# Patient Record
Sex: FEMALE | Race: BLACK OR AFRICAN AMERICAN
[De-identification: names, ages, dates, MRNs, and addresses within clinical notes are randomized per-mention and may not be internally consistent; named-entity substitution may affect disease eponyms.]

---

## 2018-11-26 ENCOUNTER — HOSPITAL ENCOUNTER (EMERGENCY)
Dept: HOSPITAL 72 - EMR | Age: 23
Discharge: HOME | End: 2018-11-26
Payer: MEDICAID

## 2018-11-26 VITALS — HEIGHT: 67 IN | BODY MASS INDEX: 23.54 KG/M2 | WEIGHT: 150 LBS

## 2018-11-26 VITALS — SYSTOLIC BLOOD PRESSURE: 118 MMHG | DIASTOLIC BLOOD PRESSURE: 81 MMHG

## 2018-11-26 VITALS — DIASTOLIC BLOOD PRESSURE: 81 MMHG | SYSTOLIC BLOOD PRESSURE: 118 MMHG

## 2018-11-26 DIAGNOSIS — L30.9: Primary | ICD-10-CM

## 2018-11-26 PROCEDURE — 99283 EMERGENCY DEPT VISIT LOW MDM: CPT

## 2018-11-26 NOTE — EMERGENCY ROOM REPORT
History of Present Illness


General


Chief Complaint:  Skin Rash/Abscess


Source:  Patient





Present Illness


HPI


23-year-old female presents to the emergency department complaining of eczema 

flare to bilateral hands 1 week. 8/10 in severity itching and burning to site 

of rash.   Patient reports she ran out of her topical medication and is 

currently changing insurances and requires a refill. No aggravating or 

relieving factors.  Patient states her symptoms are consistent with symptoms 

that she experienced in the past with previous eczema flares.  Pt. denies fevers

, chills or swollen tender lymph nodes. Denies lesions/rashes elsewhere on the 

body. Denies new medications or body washes or creams. Denies swelling of the 

lips, tongue , throat or airway. Denies wheezing, or shortness of breath.  

Denies recent travel, recent illness or ill contacts.  denies blisters, oral 

lesions, or sloughing of the skin


Allergies:  


Coded Allergies:  


     No Known Allergies (Unverified , 6/18/15)





Patient History


Past Medical History:  see triage record


Past Surgical History:  none


Pertinent Family History:  none


Last Menstrual Period:  Three weeks ago


Pregnant Now:  No


Immunizations:  UTD


Reviewed Nursing Documentation:  PMH: Agreed; PSxH: Agreed





Nursing Documentation-PMH


Past Medical History:  No Stated History





Review of Systems


All Other Systems:  negative except mentioned in HPI





Physical Exam





Vital Signs








  Date Time  Temp Pulse Resp B/P (MAP) Pulse Ox O2 Delivery O2 Flow Rate FiO2


 


11/26/18 14:43 99.0 84 16 118/81 99 Room Air  








Sp02 EP Interpretation:  reviewed, normal


General Appearance:  no apparent distress, alert, GCS 15, non-toxic


Head:  normocephalic, atraumatic


Eyes:  bilateral eye normal inspection, bilateral eye PERRL


ENT:  hearing grossly normal, no angioedema, normal voice, other - no stridor


Neck:  full range of motion, supple/symm/no masses


Respiratory:  lungs clear, normal breath sounds, no respiratory distress, no 

accessory muscle use, no wheezing, speaking full sentences


Cardiovascular #1:  regular rate, rhythm, no edema


Musculoskeletal:  back normal, gait/station normal, normal range of motion, non-

tender


Neurologic:  alert, oriented x3, responsive, motor strength/tone normal, 

sensory intact, speech normal, grossly normal


Psychiatric:  judgement/insight normal


Skin:  normal color, no rash, warm/dry, well hydrated, rash - eczema flare to 

bilateral hands, no blisters, vesicles, crusting, d/c , erythema or warmth 

noted.





Medical Decision Making


PA Attestation


Dr. pierce is my supervising Physician whom patient management has been 

discussed with.


Diagnostic Impression:  


 Primary Impression:  


 Eczema


 Qualified Codes:  L30.9 - Dermatitis, unspecified


ER Course


23-year-old female presents to the emergency department complaining of eczema 

flare to bilateral hands 1 week. 8/10 in severity itching and burning to site 

of rash.   Patient reports she ran out of her topical medication and is 

currently changing insurances and requires a refill. No aggravating or 

relieving factors.  Patient states her symptoms are consistent with symptoms 

that she experienced in the past with previous eczema flares.  Pt. denies fevers

, chills or swollen tender lymph nodes. Denies lesions/rashes elsewhere on the 

body. Denies new medications or body washes or creams. Denies swelling of the 

lips, tongue , throat or airway. Denies wheezing, or shortness of breath.  

Denies recent travel, recent illness or ill contacts.  denies blisters, oral 

lesions, or sloughing of the skin.





Ddx considered but are not limited to cellulitis, scabies, shingles, varicella, 

dermatitis, urticaria, eczema, tinea, viral exanthem, SJS





Vital signs: are WNL, pt. is afebrile





H&PE are most consistent with eczema flare to bilateral hands, no blisters, 

vesicles, crusting, d/c , erythema or warmth noted. 





ORDERS: none required at this time, the diagnosis is clinical





ED INTERVENTIONS: None required at this time. 





DISCHARGE: At this time pt. is stable for d/c to home. Will provide printed 

patient care instructions, and any necessary prescriptions. Care plan and 

follow up instructions have been discussed with the patient prior to discharge.





Last Vital Signs








  Date Time  Temp Pulse Resp B/P (MAP) Pulse Ox O2 Delivery O2 Flow Rate FiO2


 


11/26/18 14:43 99.0 84 16 118/81 99 Room Air  








Disposition:  HOME, SELF-CARE


Condition:  Stable


Scripts


Fluocinonide (FLUOCINONIDE) 15 Gm Cream..g.


1 APPLIC TP BID, #30 GM


   Prov: Nitza Macias         11/26/18


Patient Instructions:  Eczema





Additional Instructions:  


Take medications as directed. 





 ** Follow up with a Primary Care Provider in 3-5 days for DERMATOLOGY REFERRAL

, even if your symptoms have resolved. ** 


--Please review list of primary care clinics, if you do not already have a 

primary care provider





Return sooner to ED if new symptoms occur, or current symptoms become worse. 











- Please note that this Emergency Department Report was dictated using [employer] technology software, occasionally this can lead to 

erroneous entry secondary to interpretation by the dictation equipment.











Nitza Macias Nov 26, 2018 14:59

## 2019-11-01 ENCOUNTER — HOSPITAL ENCOUNTER (EMERGENCY)
Dept: HOSPITAL 72 - EMR | Age: 24
Discharge: HOME | End: 2019-11-01
Payer: MEDICAID

## 2019-11-01 VITALS — SYSTOLIC BLOOD PRESSURE: 120 MMHG | DIASTOLIC BLOOD PRESSURE: 87 MMHG

## 2019-11-01 VITALS — WEIGHT: 140 LBS | HEIGHT: 67 IN | BODY MASS INDEX: 21.97 KG/M2

## 2019-11-01 VITALS — DIASTOLIC BLOOD PRESSURE: 88 MMHG | SYSTOLIC BLOOD PRESSURE: 125 MMHG

## 2019-11-01 DIAGNOSIS — T16.1XXA: Primary | ICD-10-CM
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
## 2019-11-01 PROCEDURE — 99282 EMERGENCY DEPT VISIT SF MDM: CPT

## 2019-11-01 NOTE — EMERGENCY ROOM REPORT
History of Present Illness


General


Chief Complaint:  Earache


Source:  Patient





Present Illness


HPI


Patient presents with complaints of discomfort to the right ear reports that 

upon awaking recently she has felt something moving





And presents to the ER denies any headache denies any visual changes denies any 

fevers denies any change in hearing





She feels that there is likely something in there


Allergies:  


Coded Allergies:  


     No Known Allergies (Unverified , 6/18/15)





Patient History


Past Medical History:  see triage record


Reviewed Nursing Documentation:  PMH: Agreed; PSxH: Agreed





Nursing Documentation-PMH


Past Medical History:  No Stated History





Review of Systems


All Other Systems:  negative except mentioned in HPI





Physical Exam





Vital Signs








  Date Time  Temp Pulse Resp B/P (MAP) Pulse Ox O2 Delivery O2 Flow Rate FiO2


 


11/1/19 11:44 98.2 78 19 125/88 (100) 100 Room Air  








Sp02 EP Interpretation:  reviewed, normal


General Appearance:  well appearing, no apparent distress


Head:  normocephalic, atraumatic


Eyes:  bilateral eye PERRL, bilateral eye EOMI


ENT:  hearing grossly normal, other - Evaluation of the right ear reveals what 

appears to be likely silver fish fairly small just adjacent to the tympanic 

membrane


Neck:  supple


Respiratory:  lungs clear, no respiratory distress


Cardiovascular #1:  regular rate, rhythm


Gastrointestinal:  non tender, soft


Musculoskeletal:  normal inspection


Neurologic:  alert, oriented x3, responsive


Skin:  no rash


Lymphatic:  no adenopathy





Medical Decision Making


Diagnostic Impression:  


 Primary Impression:  


 foreign body


ER Course


Given the exam and findings the area was flushed with lidocaine


There was increased movement of the insect





On reevaluation it does appear that it is not moving any longer


Several attempts were made to flush the ear along with the removal with 

different instruments however it is unsuccessful


Patient will have ointments applied


3-4 times per day


And will have close outpatient follow-up





Last Vital Signs








  Date Time  Temp Pulse Resp B/P (MAP) Pulse Ox O2 Delivery O2 Flow Rate FiO2


 


11/1/19 11:55 98.2 78 19 125/88 100 Room Air  








Status:  improved


Disposition:  HOME, SELF-CARE


Condition:  Stable


Scripts


Neomycin/Polymyxin B Sulf/Hc* (CORTISPORIN EAR SOLUTION*) 10 Ml Solution


2 DROP OTIC FOUR TIMES A DAY for 7 Days, #1 EA


   Instill in affected ear as directed for 7 days


   Prov: Viet Chavez DO         11/1/19


Referrals:  


[facility],REFERRING (PCP)











Eddie Villalba MD











[facility]











[facility]


Patient Instructions:  Ear Foreign Body, Easy-to-Read





Additional Instructions:  


unfortunately, the foreign body was not able to be fully removed.  We will have 

conservative outpatient attempt.  Please apply the drops as prescribed.





Patient is provided with the discharge instructions notified to follow up with 

primary doctor in the next 2-3 days otherwise return to the er with any 

worsening symptoms.


Please note that this report is being documented using DRAGON technology.  This 

can lead to erroneous entry secondary to incorrect interpretation by the 

dictating instrument.











Viet Chavez DO Nov 1, 2019 13:02

## 2019-11-01 NOTE — NUR
ED Nurse Note:

pt walked in to ED for eval.  pt felt something crawling inside of right ear 
since this morning.  pt denies any pain or discharge.  no hearing loss.  AAO 
x4.  respirations even and non-labored noted.  will wait for the further order.